# Patient Record
Sex: FEMALE | Race: OTHER | NOT HISPANIC OR LATINO
[De-identification: names, ages, dates, MRNs, and addresses within clinical notes are randomized per-mention and may not be internally consistent; named-entity substitution may affect disease eponyms.]

---

## 2021-10-21 ENCOUNTER — APPOINTMENT (OUTPATIENT)
Dept: OPHTHALMOLOGY | Facility: CLINIC | Age: 41
End: 2021-10-21

## 2023-04-26 PROBLEM — Z00.00 ENCOUNTER FOR PREVENTIVE HEALTH EXAMINATION: Status: ACTIVE | Noted: 2023-04-26

## 2023-05-04 ENCOUNTER — APPOINTMENT (OUTPATIENT)
Dept: PHYSICAL MEDICINE AND REHAB | Facility: CLINIC | Age: 43
End: 2023-05-04
Payer: COMMERCIAL

## 2023-05-04 VITALS
HEIGHT: 63 IN | RESPIRATION RATE: 18 BRPM | HEART RATE: 75 BPM | BODY MASS INDEX: 29.23 KG/M2 | SYSTOLIC BLOOD PRESSURE: 126 MMHG | DIASTOLIC BLOOD PRESSURE: 86 MMHG | WEIGHT: 165 LBS

## 2023-05-04 DIAGNOSIS — Z78.9 OTHER SPECIFIED HEALTH STATUS: ICD-10-CM

## 2023-05-04 DIAGNOSIS — M79.18 MYALGIA, OTHER SITE: ICD-10-CM

## 2023-05-04 DIAGNOSIS — Z82.49 FAMILY HISTORY OF ISCHEMIC HEART DISEASE AND OTHER DISEASES OF THE CIRCULATORY SYSTEM: ICD-10-CM

## 2023-05-04 DIAGNOSIS — M25.362 OTHER INSTABILITY, LEFT KNEE: ICD-10-CM

## 2023-05-04 DIAGNOSIS — M54.12 RADICULOPATHY, CERVICAL REGION: ICD-10-CM

## 2023-05-04 DIAGNOSIS — R20.0 ANESTHESIA OF SKIN: ICD-10-CM

## 2023-05-04 PROCEDURE — 99204 OFFICE O/P NEW MOD 45 MIN: CPT

## 2023-05-04 NOTE — HISTORY OF PRESENT ILLNESS
[FreeTextEntry1] : Ms. ODUGLAS TSAI is a very pleasant 43 year female who seen for evaluation of 2 issues  that has been ongoing for 2 months   without any specific injury or inciting event.\par 1 -left arm pain with numbness tingling \par 2 left leg gives way sometimes when she gets up suddenly from couch  -has not happened in 2 weeks was happening daily for a month or so \par \par The neck shoulder  pain is located primarily left arm  intermittent in nature and described as dull throbbing  . The pain is rated as 2/10 during today's visit, and ranges from 2-7/10. The patient's symptoms are aggravated by sitting too long she works in an office is also a   and alleviated by movement rest heat . The patient denies any night pain, numbness/tingling, weakness, or bowel/bladder dysfunction. The patient has no other complaints at this time.\par she had EMG 3 years ago neg \par she has had chiropractic and acupuncture but pre pandemic \par

## 2023-05-04 NOTE — CONSULT LETTER
[FreeTextEntry1] : Dear Dr. DEANA FORTUNE  , \par \par I had the pleasure of evaluating your patient, DOUGLAS TSAI .\par \par Thank you very much for allowing me to participate in the care of this patient. If you have any questions, please do not hesitate to contact me. \par \par Sincerely, \par Mike Kapadia MD \par \par ABPMR Board Certified in Physical Medicine and Rehabilitation\par Certified Fellow of AANEM (Neuromuscular and Electrodiagnostic Medicine)\par Subspecialty certified in Sports Medicine (ABPMR)\par \par  of Physical Medicine and Rehabilitation\par [facility] [employer] [facility]\par [facility]\par \par

## 2023-05-04 NOTE — ASSESSMENT
[FreeTextEntry1] : \par PLAN AND RECOMMENDATIONS :\par \par We discussed differential diagnosis and clinical impression\par this is a cervical radic \par reassured prognosis good \par conservative rx advised \par and nsaids\par \par Recommend\par .symptomatic care and support\par  medications NSAIDS naprosyn 500 mg  twice a day), -warned of  possible GI side effects -advised to take with meals or add over the counter Nexium, if sensitive\par \par  imaging not needed \par  referral to PT near home \par  hydrotherapy /heat / cold for pain\par  continue  spinal precautions including care with bending, lifting, twisting and  carrying.\par \par \par  relative rest and avoidance of painful activity where possible \par  increasing activity as discussed \par  return for follow up 4-6 weeks \par The patient had the opportunity to ask questions and all were answered to their satisfaction. We will coordinate treatment with the other members of the treatment team.\par The patient verbalized understanding of the management/plan rationale and agreed with my recommendations.\par \par

## 2023-05-04 NOTE — REVIEW OF SYSTEMS
[Patient Intake Form Reviewed] : Patient intake form was reviewed [Joint Pain] : joint pain [Joint Stiffness] : joint stiffness [Fever] : no fever [Recent Change In Weight] : ~T no recent weight change [Lower Ext Edema] : no lower extremity edema [Shortness Of Breath] : no shortness of breath [Muscle Weakness] : no muscle weakness

## 2023-05-04 NOTE — PHYSICAL EXAM
[No Visual Abnormalities] : no visible abnormalities [Normal Lordosis] : normal lordosis [No Scoliosis] : no scoliosis [No Masses] : no masses [Full ROM] : full ROM [FreeTextEntry1] : PHYSICAL EXAM : OBJECTIVE \par \par GENERAL : Awake ,alert and oriented to time place and person \par HEAD : normocephalic and atraumatic \par NECK : supple ,no tracheal deviation ,no thyroid enlargement noted with swallowing\par EYES : sclera and conjunctiva normal no redness,intact extraocular movements \par ENT  : ears and nose normal in appearance -hearing adequate \par PULMONARY: effort normal. No respiratory distress. breathing regular. No wheezes \par LYMPH : No swelling in limbs, capillary return within normal range \par CVS : warm extremities,no palpitations,not short of breath, no visible jugular venous distention\par PSYCH : mood and affect normal ,good eye contact ,normal attention \par ABDOMEN : no visible distension , \par NEUROLOGICAL:cranial nerves intact,muscle tone normal,gait and balance safe except where noted below \par SKIN : warm and dry No rash detected over specific body areas examined \par MUSCULOSKELETAL: normal muscle bulk, no focal bony tenderness /posture normal except where specified below\par c spine ROM \par shoulder ROM full \par No long tract signs found on clinical exam and no focal neurological deficits\par MMT 5/5 \par left knee NL exam no swelling ROM full  [L'Hermitte's] : neck flexion did not produce tingling down the spine/arms [Spurling's - Opposite Side] : Negative Spurling's on opposite side [Spurling's Same Side] : Negative Spurling's on same side

## 2023-09-19 ENCOUNTER — RX RENEWAL (OUTPATIENT)
Age: 43
End: 2023-09-19

## 2023-11-09 ENCOUNTER — RX RENEWAL (OUTPATIENT)
Age: 43
End: 2023-11-09

## 2023-12-05 ENCOUNTER — RX RENEWAL (OUTPATIENT)
Age: 43
End: 2023-12-05

## 2024-02-06 ENCOUNTER — APPOINTMENT (OUTPATIENT)
Dept: OTOLARYNGOLOGY | Facility: CLINIC | Age: 44
End: 2024-02-06
Payer: COMMERCIAL

## 2024-02-06 VITALS — WEIGHT: 180 LBS | BODY MASS INDEX: 31.89 KG/M2 | HEIGHT: 63 IN

## 2024-02-06 DIAGNOSIS — Z78.9 OTHER SPECIFIED HEALTH STATUS: ICD-10-CM

## 2024-02-06 DIAGNOSIS — J31.0 CHRONIC RHINITIS: ICD-10-CM

## 2024-02-06 DIAGNOSIS — H93.293 OTHER ABNORMAL AUDITORY PERCEPTIONS, BILATERAL: ICD-10-CM

## 2024-02-06 DIAGNOSIS — H69.92 UNSPECIFIED EUSTACHIAN TUBE DISORDER, LEFT EAR: ICD-10-CM

## 2024-02-06 DIAGNOSIS — K21.9 GASTRO-ESOPHAGEAL REFLUX DISEASE W/OUT ESOPHAGITIS: ICD-10-CM

## 2024-02-06 PROCEDURE — 31575 DIAGNOSTIC LARYNGOSCOPY: CPT

## 2024-02-06 PROCEDURE — 92567 TYMPANOMETRY: CPT

## 2024-02-06 PROCEDURE — 99203 OFFICE O/P NEW LOW 30 MIN: CPT | Mod: 25

## 2024-02-06 PROCEDURE — 92557 COMPREHENSIVE HEARING TEST: CPT

## 2024-02-06 NOTE — PHYSICAL EXAM
[TextEntry] : PHYSICAL EXAM  General: The patient was alert, oriented and in no distress. Voice was clear.  Face: The patient had no facial asymmetry or mass. The skin was unremarkable.  Ears: Hearing normal to conversational voice External ears were normal without deformity. External ear canals: AS- normal. no cerumen or inflammation. AD- cerumen impaction Tympanic membranes: AS- intact. no perforation or effusion but there was an area consistent with a small hemorrhage on the central TM.  PROCEDURE- EAR MICROSCOPY AND CERUMEN REMOVAL from right ear  Indication: cerumen removal Under the microscope, obstructing cerumen was removed atraumatically from the right ear with a suction, curette and/or forceps.  Canal: normal. no inflammation.  Tympanic membrane: Intact. no perforation or effusion.  Nose:  The external nose had no significant deformity.     On anterior rhinoscopy, the nasal mucosa was clear.   The anterior septum was grossly midline. There were no visualized polyps, purulence  or masses.   Oral cavity: Oral mucosa- normal. Oral and base of tongue- clear and without mass. Gingival and buccal mucosa- moist and without lesions. Palate- the palate moved well. There was no cleft palate. There appeared to be good salivary flow.   Oral cavity/oropharynx- no pus, erythema or mass  Neck:  The neck was symmetrical. The parotid and submandibular glands were normal without masses. The trachea was midline and there was no unusual crepitus. Thyroid was smooth and nontender and no masses were palpated. No masses  Lymphatics: Cervical adenopathy- none.    PROCEDURE:  FLEXIBLE LARYNGOSCOPY, NASAL ENDOSCOPY   Surgeon: Dr. Reyes Indication: Reflux, chronic rhinitis, inadequate/inability to tolerate transoral exam Anesthetic: Topical lidocaine and Afrin Procedure: The patient was placed in a sitting position.  Following application of the topical anesthetic and decongestant, exam was performed with a flexible telescope.  The scope was passed along the right nasal floor to the nasopharynx.  It was then passed into the region of the middle meatus, middle turbinate, and sphenoethmoid region.  An identical procedure was performed on the left side.  The following findings were noted:  Nasal mucosa: Mild edema Septum: Wavy Right nasal cavity      Inferior turbinate: Hypertrophic      Middle turbinate: normal      Superior turbinate: normal      Inferior meatus: no pus, polyps or congestion      Middle meatus:  no pus, polyps or congestion       Superior meatus:  no pus, polyps, or congestion      Sphenoethmoidal recess: no pus, polyps or congestion  Left nasal cavity      Inferior turbinate: Hypertrophic      Middle turbinate: normal      Superior turbinate: normal      Inferior meatus: no pus, polyps or congestion      Middle meatus: no pus, polyps, or congestion      Superior meatus:  no pus, polyps, or congestion      Sphenoethmoidal recess: no pus, polyps or congestion   Nasopharynx: no masses, choanae patent, no adenoid tissue  Base of tongue and vallecula: no masses or asymmetry Posterior pharyngeal wall: no masses.  Hypopharynx: symmetrical. No masses Pyriform sinuses: no lesions or pooling of secretions Epiglottis: normal. No edema or lesions Aryepiglottic folds: normal. No lesions.  True vocal cords: clear and mobile. No lesions. Airway patent False vocal cords: normal Ventricles: no masses.  Arytenoids: Mild erythema Interarytenoid area: no masses.  Severe edema and mild erythema Subglottis: normal. no masses    AUDIOGRAM- test ordered and the results reviewed and discussed with the patient.  Hearing-normal Word recognition-normal Tympanograms- AD- type A, AS- type C

## 2024-02-06 NOTE — ASSESSMENT
[FreeTextEntry1] : She has history of chronic rhinitis and reflux.  She has symptoms consistent with reflux exacerbation.  On exam, she had nasal mucosal edema, mildly deviated septum, inferior turbinate hypertrophy, and laryngeal changes consistent with reflux.  She had a normal audiogram and a type C tympanogram on the left side.  On exam she did have an area on the central TM consistent with a small hemorrhage.  She flew recently.  Plan -Findings and management options were discussed with the patient. -Good ear hygiene reviewed.  Avoid using cotton swabs in the ears -Noise precautions -Monitor hearing -I will reevaluate the left tympanic membrane when she returns -Nasal saline irrigations and a trial of a nasal steroid spray  -allergy evaluation -Reflux precautions elevation of the head of bed at night.  She was given literature regarding management of reflux -I recommended trial of Pepcid for 3 weeks -GI evaluation pending -I will see her back after the allergy evaluation She should call and return earlier if any problem-

## 2024-02-06 NOTE — HISTORY OF PRESENT ILLNESS
[de-identified] : DOUGLAS TSAI is a 43 year old patient  referred by Dr. Cannon for ENT evaluation.  She has had symptoms consistent with reflux.  It started in December.  She did not have an upper respiratory tract infection.  She had a dry cough, hoarseness, globus sensation, postnasal drip, and phlegm at night.  She also had mild nasal congestion.  She had been under a lot of stress.  She was also diagnosed with a mild pneumonia.  She took antibiotics for that as well as for UTI.  She has been following dietary precautions which has helped.  She took Pepcid for short time after she had palpitations in January.  She has a cardiology and GI appointments pending.  She is also concerned she may have hearing loss  She may have allergies although she has not had testing She does not typically suffer from recurrent sinusitis No history of nasal trauma or nasal/sinus surgery She has no pets at home She does not smoke tobacco or marijuana She does use nasal saline irrigations  No history of recurrent ear infections or prior otologic surgery She does have a history of noise exposure from music

## 2024-02-06 NOTE — CONSULT LETTER
[Dear  ___] : Dear  [unfilled], [Consult Letter:] : I had the pleasure of evaluating your patient, [unfilled]. [Please see my note below.] : Please see my note below. [Consult Closing:] : Thank you very much for allowing me to participate in the care of this patient.  If you have any questions, please do not hesitate to contact me. [Sincerely,] : Sincerely, [FreeTextEntry3] : Denisse Reyes MD [facility] at [facility] Otolaryngology  Head & Neck Surgery [facility]   Department of Otolaryngology [facility] at \[facility]\""/[facility]  Office Tel: [phone]

## 2024-02-08 PROBLEM — H93.293 ABNORMAL AUDITORY PERCEPTION OF BOTH EARS: Status: ACTIVE | Noted: 2024-02-06

## 2024-02-27 ENCOUNTER — RX RENEWAL (OUTPATIENT)
Age: 44
End: 2024-02-27

## 2024-02-27 RX ORDER — NAPROXEN 500 MG/1
500 TABLET ORAL
Qty: 60 | Refills: 0 | Status: ACTIVE | COMMUNITY
Start: 2023-05-04 | End: 1900-01-01

## 2024-04-12 ENCOUNTER — TRANSCRIPTION ENCOUNTER (OUTPATIENT)
Age: 44
End: 2024-04-12

## 2024-04-12 ENCOUNTER — RESULT REVIEW (OUTPATIENT)
Age: 44
End: 2024-04-12

## 2024-04-14 ENCOUNTER — NON-APPOINTMENT (OUTPATIENT)
Age: 44
End: 2024-04-14

## 2024-04-22 ENCOUNTER — APPOINTMENT (OUTPATIENT)
Dept: UROLOGY | Facility: CLINIC | Age: 44
End: 2024-04-22
Payer: COMMERCIAL

## 2024-04-22 VITALS
OXYGEN SATURATION: 98 % | TEMPERATURE: 98.06 F | WEIGHT: 180 LBS | SYSTOLIC BLOOD PRESSURE: 123 MMHG | DIASTOLIC BLOOD PRESSURE: 75 MMHG | HEIGHT: 63 IN | HEART RATE: 73 BPM | BODY MASS INDEX: 31.89 KG/M2

## 2024-04-22 DIAGNOSIS — Z78.9 OTHER SPECIFIED HEALTH STATUS: ICD-10-CM

## 2024-04-22 DIAGNOSIS — R39.15 URGENCY OF URINATION: ICD-10-CM

## 2024-04-22 DIAGNOSIS — Z80.42 FAMILY HISTORY OF MALIGNANT NEOPLASM OF PROSTATE: ICD-10-CM

## 2024-04-22 DIAGNOSIS — R35.0 FREQUENCY OF MICTURITION: ICD-10-CM

## 2024-04-22 DIAGNOSIS — Z82.49 FAMILY HISTORY OF ISCHEMIC HEART DISEASE AND OTHER DISEASES OF THE CIRCULATORY SYSTEM: ICD-10-CM

## 2024-04-22 DIAGNOSIS — R31.29 OTHER MICROSCOPIC HEMATURIA: ICD-10-CM

## 2024-04-22 PROCEDURE — 99204 OFFICE O/P NEW MOD 45 MIN: CPT

## 2024-04-23 LAB
APPEARANCE: CLEAR
BACTERIA: ABNORMAL /HPF
BILIRUB UR QL STRIP: NORMAL
BILIRUBIN URINE: NEGATIVE
BLOOD URINE: NEGATIVE
CAST: 0 /LPF
CLARITY UR: CLEAR
COLLECTION METHOD: NORMAL
COLOR: YELLOW
EPITHELIAL CELLS: 24 /HPF
GLUCOSE QUALITATIVE U: NEGATIVE MG/DL
GLUCOSE UR-MCNC: NORMAL
HCG UR QL: 0.2 EU/DL
HGB UR QL STRIP.AUTO: NORMAL
KETONES UR-MCNC: NORMAL
KETONES URINE: NEGATIVE MG/DL
LEUKOCYTE ESTERASE UR QL STRIP: NORMAL
LEUKOCYTE ESTERASE URINE: NEGATIVE
MICROSCOPIC-UA: NORMAL
NITRITE UR QL STRIP: NORMAL
NITRITE URINE: NEGATIVE
PH UR STRIP: 5.5
PH URINE: 5.5
PROT UR STRIP-MCNC: NORMAL
PROTEIN URINE: NEGATIVE MG/DL
RED BLOOD CELLS URINE: 3 /HPF
SP GR UR STRIP: 1.03
SPECIFIC GRAVITY URINE: 1.03
UROBILINOGEN URINE: 0.2 MG/DL
WHITE BLOOD CELLS URINE: 1 /HPF

## 2024-04-23 NOTE — HISTORY OF PRESENT ILLNESS
[FreeTextEntry1] : Language: English Date of First visit: 2024 Accompanied by: *** Contact info: *** Referring Provider/PCP: Dr. Cannon  [phone]      CC/ Problem List:   =============================================================================== FIRST VISIT: The patient is a 44 year female who first presents on 2024 for urinary leaking, urgency for a "few years". She has intermittent urgency. When she is at home she feels like she goes to the bathroom a "lot". when she is in the office she does not go more often. She states she has occasional incontinence... she gets this sense from not feeling as fresh but she does not feel like anything is coming out. She has no constipation. She has not had intercourse in a while. She has no h/o dyspareunia. She is going to see her gyn. Her periods are starting to get a bit different. She thinks she is menopause. Her hair is thinning.  She states her steam is okay. She denies straining to void. She sometimes has some urgency and she can't always stop her stream. She has occasional LBP.  She had LBP and SP pain when she had a UTI in .  She does not get regular yeast infections. She is not on birth control.    She does not drink coffee. She drinks black and green tea 1 cup or less. No soda. Rare chocolate. ++Spicy food. Not as much acidic food.  She had an ankle injury in  and gained 20lbs and 10 lbs over the pandemic. She is still getting PT for her ankle.  Her periods can be heavy but are generally short and not overly painful.   ------------------------------------------------------------------------------------------- INTERVAL VISITS:     ===============================================================================   PMH: Heart palp PSH: D& C POBH: (if applicable)  FH:   ALL: PCN MEDS: Vit D SOC: No Tob, Social ETOH, no drugs     ROS: Review of Systems is as per HPI unless otherwise denoted below     =============================================================================== DATA:   LABS:-------------------------------------------------------------------------------------------------------------------  2024: UA dip negative except trace blood lysed     RADS:-------------------------------------------------------------------------------------------------------------------       PATHOLOGY/CYTOLOGY:-------------------------------------------------------------------------------------------       VOIDING STUDIES: ----------------------------------------------------------------------------------------------------  2024: PVR 7cc     STONE STUDIES: (Analysis/LLSA)----------------------------------------------------------------------------------       PROCEDURES: -----------------------------------------------------------------------------------------------         ===============================================================================    PHYSICAL EXAM:  GEN: AAOx3, NAD, Habitus: OBI  BARRIERS to CARE: none  PSYCH: Appropriate Behavior, Affect Congruent  HEENT: AT/NC Trachea midline.   Lungs: No labored breathing.  NEURO: + Movement, all 4 extremities grossly intact without deficits. No tremors.  SKIN: Warm dry. No visible rashes or ulcers  GAIT: Gait normal, Stability good   =======================================================================================     ASSESSMENT and PLAN   The patient is a 44 year female with a history of the followin. Urinary frequency, difficulty controlling her stream, in this 45yo perimenopausal female with h/o recent weight gain, and ankle injury. She does not want to consider meds at this time so we discussed the following options: We recommended that she consider PF PT and I explained this. She agreed and has friends that have done this. I also recommended that she avoid caffeine, ETOH, spicy and acidic foods. She agreed to this.   She will f/u with DR. Douglas in 4-5 mos after she has tried PT. Her questions were answered. She is seeing her gyn for her perimenopausal symptoms.    2. r/o microhematuria UA micro   -----------------------------------------------------------------------------------------------------   LABS/TESTS Ordered: UA micro, PF PT   Meds Ordered:   Follow up: see Dr. Douglsa in 4-5 mos   -----------------------------------------------------------------------------------------------------    The total amount of time I have personally spent preparing for this visit, reviewing the patient's test results, obtaining external history, ordering tests/medications, documenting clinical information, communicating with and counseling the patient/family and/or caregiver(s), and spent face to face with the patient explaining the above was 45 minutes.  Thank you for allowing me to participate in your patient's care. Please feel free to contact me with any questions.   Angie Garcia MD [facility] Department of Urology   [address] [place] [id number] P: [phone]; F: [phone]

## 2024-04-29 ENCOUNTER — APPOINTMENT (OUTPATIENT)
Dept: PEDIATRIC ALLERGY IMMUNOLOGY | Facility: CLINIC | Age: 44
End: 2024-04-29
Payer: COMMERCIAL

## 2024-06-03 ENCOUNTER — APPOINTMENT (OUTPATIENT)
Dept: PEDIATRIC ALLERGY IMMUNOLOGY | Facility: CLINIC | Age: 44
End: 2024-06-03
Payer: COMMERCIAL

## 2024-06-03 VITALS
SYSTOLIC BLOOD PRESSURE: 132 MMHG | HEART RATE: 101 BPM | OXYGEN SATURATION: 96 % | WEIGHT: 182.5 LBS | BODY MASS INDEX: 32.34 KG/M2 | DIASTOLIC BLOOD PRESSURE: 75 MMHG | HEIGHT: 63 IN

## 2024-06-03 PROCEDURE — 99204 OFFICE O/P NEW MOD 45 MIN: CPT | Mod: 25

## 2024-06-03 PROCEDURE — 95004 PERQ TESTS W/ALRGNC XTRCS: CPT

## 2024-06-03 NOTE — IMPRESSION
[_____] : grasses ([unfilled]) [Allergy Testing Dust Mite] : dust mites [Allergy Testing Mixed Feathers] : feathers [Allergy Testing Cockroach] : cockroach [Allergy Testing Dog] : dog [Allergy Testing Cat] : cat [Allergy Testing Trees] : trees [________] : [unfilled]

## 2024-06-03 NOTE — HISTORY OF PRESENT ILLNESS
[de-identified] : I had the pleasure of seeing Leonarda upon the request of Dr. Cannon.  Leonarda is a 43 yo female with h/o alopecia and AR here for an allergy evalaution.  AR: Reports seasonal symptoms of nasal congestion and itchy eyes- uses OTC nasal spray PRN or acupuncture. Reports previous skin  testing > 10 yrs ago with + environmental allergies.  Alopecia: Followed by Derm- currently on Spirolactone as treatment. Was questioning if any need for food allergy testing as may act as inflammatory reactant??

## 2024-07-19 ENCOUNTER — APPOINTMENT (OUTPATIENT)
Dept: OTOLARYNGOLOGY | Facility: CLINIC | Age: 44
End: 2024-07-19
Payer: COMMERCIAL

## 2024-07-19 VITALS — BODY MASS INDEX: 31.89 KG/M2 | HEIGHT: 63 IN | WEIGHT: 180 LBS

## 2024-07-19 DIAGNOSIS — Z91.09 OTHER ALLERGY STATUS, OTHER THAN TO DRUGS AND BIOLOGICAL SUBSTANCES: ICD-10-CM

## 2024-07-19 DIAGNOSIS — H69.92 UNSPECIFIED EUSTACHIAN TUBE DISORDER, LEFT EAR: ICD-10-CM

## 2024-07-19 DIAGNOSIS — K21.9 GASTRO-ESOPHAGEAL REFLUX DISEASE W/OUT ESOPHAGITIS: ICD-10-CM

## 2024-07-19 DIAGNOSIS — J31.0 CHRONIC RHINITIS: ICD-10-CM

## 2024-07-19 PROCEDURE — 99213 OFFICE O/P EST LOW 20 MIN: CPT

## 2024-07-19 RX ORDER — SPIRONOLACTONE 25 MG/1
25 TABLET ORAL
Refills: 0 | Status: ACTIVE | COMMUNITY

## 2024-09-23 ENCOUNTER — APPOINTMENT (OUTPATIENT)
Dept: PEDIATRIC ALLERGY IMMUNOLOGY | Facility: CLINIC | Age: 44
End: 2024-09-23

## 2024-11-05 ENCOUNTER — APPOINTMENT (OUTPATIENT)
Dept: UROLOGY | Facility: CLINIC | Age: 44
End: 2024-11-05

## 2024-11-05 VITALS
DIASTOLIC BLOOD PRESSURE: 84 MMHG | HEART RATE: 95 BPM | OXYGEN SATURATION: 99 % | TEMPERATURE: 208.4 F | SYSTOLIC BLOOD PRESSURE: 151 MMHG

## 2024-11-05 PROCEDURE — 99204 OFFICE O/P NEW MOD 45 MIN: CPT

## 2024-11-06 LAB
APPEARANCE: CLEAR
BACTERIA: NEGATIVE /HPF
BILIRUBIN URINE: NEGATIVE
BLOOD URINE: NEGATIVE
CAST: 0 /LPF
COLOR: YELLOW
EPITHELIAL CELLS: 18 /HPF
GLUCOSE QUALITATIVE U: NEGATIVE MG/DL
KETONES URINE: ABNORMAL MG/DL
LEUKOCYTE ESTERASE URINE: NEGATIVE
MICROSCOPIC-UA: NORMAL
MUCUS: PRESENT
NITRITE URINE: NEGATIVE
PH URINE: 6
PROTEIN URINE: NORMAL MG/DL
RED BLOOD CELLS URINE: 1 /HPF
REVIEW: NORMAL
SPECIFIC GRAVITY URINE: 1.03
UROBILINOGEN URINE: 0.2 MG/DL
WHITE BLOOD CELLS URINE: 1 /HPF

## 2024-11-11 ENCOUNTER — NON-APPOINTMENT (OUTPATIENT)
Age: 44
End: 2024-11-11

## 2024-11-11 LAB — BACTERIA UR CULT: NORMAL

## 2024-11-15 ENCOUNTER — APPOINTMENT (OUTPATIENT)
Dept: OTOLARYNGOLOGY | Facility: CLINIC | Age: 44
End: 2024-11-15
Payer: COMMERCIAL

## 2024-11-15 ENCOUNTER — NON-APPOINTMENT (OUTPATIENT)
Age: 44
End: 2024-11-15

## 2024-11-15 DIAGNOSIS — K21.9 GASTRO-ESOPHAGEAL REFLUX DISEASE W/OUT ESOPHAGITIS: ICD-10-CM

## 2024-11-15 DIAGNOSIS — H93.293 OTHER ABNORMAL AUDITORY PERCEPTIONS, BILATERAL: ICD-10-CM

## 2024-11-15 DIAGNOSIS — J31.0 CHRONIC RHINITIS: ICD-10-CM

## 2024-11-15 DIAGNOSIS — Z91.09 OTHER ALLERGY STATUS, OTHER THAN TO DRUGS AND BIOLOGICAL SUBSTANCES: ICD-10-CM

## 2024-11-15 PROCEDURE — 99213 OFFICE O/P EST LOW 20 MIN: CPT

## 2024-11-15 PROCEDURE — 92557 COMPREHENSIVE HEARING TEST: CPT

## 2024-11-15 PROCEDURE — 92567 TYMPANOMETRY: CPT

## 2024-11-15 RX ORDER — DROSPIRENONE AND ESTETROL 3-14.2(28)
KIT ORAL
Refills: 0 | Status: ACTIVE | COMMUNITY

## 2024-11-15 RX ORDER — SEMAGLUTIDE 1.7 MG/.75ML
INJECTION, SOLUTION SUBCUTANEOUS
Refills: 0 | Status: ACTIVE | COMMUNITY

## 2024-11-25 LAB — BACTERIA UR CULT: NORMAL

## 2024-12-02 ENCOUNTER — NON-APPOINTMENT (OUTPATIENT)
Age: 44
End: 2024-12-02

## 2024-12-12 ENCOUNTER — APPOINTMENT (OUTPATIENT)
Dept: UROLOGY | Facility: CLINIC | Age: 44
End: 2024-12-12

## 2024-12-19 ENCOUNTER — APPOINTMENT (OUTPATIENT)
Dept: OTOLARYNGOLOGY | Facility: CLINIC | Age: 44
End: 2024-12-19
Payer: COMMERCIAL

## 2024-12-19 VITALS — WEIGHT: 179 LBS | BODY MASS INDEX: 31.71 KG/M2 | HEIGHT: 63 IN

## 2024-12-19 DIAGNOSIS — K21.9 GASTRO-ESOPHAGEAL REFLUX DISEASE W/OUT ESOPHAGITIS: ICD-10-CM

## 2024-12-19 DIAGNOSIS — J31.0 CHRONIC RHINITIS: ICD-10-CM

## 2024-12-19 DIAGNOSIS — Z91.09 OTHER ALLERGY STATUS, OTHER THAN TO DRUGS AND BIOLOGICAL SUBSTANCES: ICD-10-CM

## 2024-12-19 DIAGNOSIS — H69.92 UNSPECIFIED EUSTACHIAN TUBE DISORDER, LEFT EAR: ICD-10-CM

## 2024-12-19 PROCEDURE — 99213 OFFICE O/P EST LOW 20 MIN: CPT

## 2025-03-11 ENCOUNTER — NON-APPOINTMENT (OUTPATIENT)
Age: 45
End: 2025-03-11

## 2025-03-11 ENCOUNTER — APPOINTMENT (OUTPATIENT)
Dept: OTOLARYNGOLOGY | Facility: CLINIC | Age: 45
End: 2025-03-11
Payer: COMMERCIAL

## 2025-03-11 VITALS — HEIGHT: 63 IN | BODY MASS INDEX: 31.36 KG/M2 | WEIGHT: 177 LBS

## 2025-03-11 DIAGNOSIS — R43.9 UNSPECIFIED DISTURBANCES OF SMELL AND TASTE: ICD-10-CM

## 2025-03-11 DIAGNOSIS — Z91.09 OTHER ALLERGY STATUS, OTHER THAN TO DRUGS AND BIOLOGICAL SUBSTANCES: ICD-10-CM

## 2025-03-11 DIAGNOSIS — R04.0 EPISTAXIS: ICD-10-CM

## 2025-03-11 DIAGNOSIS — J31.0 CHRONIC RHINITIS: ICD-10-CM

## 2025-03-11 DIAGNOSIS — K21.9 GASTRO-ESOPHAGEAL REFLUX DISEASE W/OUT ESOPHAGITIS: ICD-10-CM

## 2025-03-11 PROCEDURE — 99213 OFFICE O/P EST LOW 20 MIN: CPT | Mod: 25

## 2025-03-11 PROCEDURE — 31231 NASAL ENDOSCOPY DX: CPT

## 2025-03-11 RX ORDER — MUPIROCIN 20 MG/G
2 OINTMENT TOPICAL 3 TIMES DAILY
Qty: 1 | Refills: 1 | Status: ACTIVE | COMMUNITY
Start: 2025-03-11 | End: 1900-01-01

## 2025-03-11 RX ORDER — FAMOTIDINE 20 MG/1
20 TABLET, FILM COATED ORAL
Qty: 60 | Refills: 1 | Status: ACTIVE | COMMUNITY
Start: 2025-03-11 | End: 1900-01-01

## 2025-03-11 RX ORDER — AZELASTINE HYDROCHLORIDE 137 UG/1
0.1 SPRAY, METERED NASAL DAILY
Qty: 1 | Refills: 3 | Status: ACTIVE | COMMUNITY
Start: 2025-03-11 | End: 1900-01-01

## 2025-03-24 ENCOUNTER — APPOINTMENT (OUTPATIENT)
Dept: OTOLARYNGOLOGY | Facility: CLINIC | Age: 45
End: 2025-03-24

## 2025-05-13 ENCOUNTER — RX RENEWAL (OUTPATIENT)
Age: 45
End: 2025-05-13

## 2025-07-08 ENCOUNTER — RX RENEWAL (OUTPATIENT)
Age: 45
End: 2025-07-08

## 2025-07-14 ENCOUNTER — APPOINTMENT (OUTPATIENT)
Dept: OTOLARYNGOLOGY | Facility: CLINIC | Age: 45
End: 2025-07-14
Payer: COMMERCIAL

## 2025-07-14 PROCEDURE — 99213 OFFICE O/P EST LOW 20 MIN: CPT

## 2025-08-06 ENCOUNTER — RX RENEWAL (OUTPATIENT)
Age: 45
End: 2025-08-06